# Patient Record
Sex: MALE | Race: WHITE
[De-identification: names, ages, dates, MRNs, and addresses within clinical notes are randomized per-mention and may not be internally consistent; named-entity substitution may affect disease eponyms.]

---

## 2017-09-21 ENCOUNTER — HOSPITAL ENCOUNTER (EMERGENCY)
Dept: HOSPITAL 10 - FTE | Age: 37
Discharge: HOME | End: 2017-09-21
Payer: SELF-PAY

## 2017-09-21 VITALS
HEIGHT: 66 IN | HEIGHT: 66 IN | BODY MASS INDEX: 26.75 KG/M2 | BODY MASS INDEX: 26.75 KG/M2 | WEIGHT: 166.45 LBS | WEIGHT: 166.45 LBS

## 2017-09-21 DIAGNOSIS — J02.9: Primary | ICD-10-CM

## 2017-09-21 PROCEDURE — 99283 EMERGENCY DEPT VISIT LOW MDM: CPT

## 2017-09-21 NOTE — ERD
ER Documentation


Chief Complaint


Date/Time


DATE: 9/21/17 


TIME: 21:59


Chief Complaint


c/o sore throat x 1 week.





HPI


This 37-year-old male complains of sore throat for last week.  May have a 

tactile fevers.  He is no chest pain or shortness of breath.  Patient gives a 

history of epigastric pain as well.  Denies lower abdominal pain, vomiting or 

diarrhea or neck stiffness or rashes.





ROS


All systems reviewed and are negative except as per history of present illness.





Medications


Home Meds


Active Scripts


Famotidine* (Pepcid*) 20 Mg Tablet, 20 MG PO BID for 14 Days, #30 TAB


   Prov:GWEN MARINO MD         9/21/17


Acetaminophen* (Tylophen*) 500 Mg Capsule, 1 CAP PO Q6H Y for PAIN AND OR 

ELEVATED TEMP, #15 CAP


   Prov:GWEN MARINO MD         9/21/17


Amoxicillin* (Amoxicillin*) 500 Mg Cap, 500 MG PO TID for 10 Days, CAP


   Prov:GWEN MARINO MD         9/21/17





Physical Exam


Vitals





Vital Signs








  Date Time  Temp Pulse Resp B/P Pulse Ox O2 Delivery O2 Flow Rate FiO2


 


9/21/17 20:37 98.7 80 18 154/90 98   








Physical Exam


Const:  [], Non-ill-appearing


Head:   Atraumatic 


Eyes:    Normal Conjunctiva


ENT:    Normal External Ears, Nose and Mouth.  Some redness in the posterior 

oropharynx.  Uvula is midline and no evidence of abscess.  Airways patent.


Neck:               Full range of motion..~ No meningismus.


Resp:    Clear to auscultation bilaterally


Cardio:    Regular rate and rhythm, no murmurs


Abd:    Soft, non tender, non distended. Normal bowel sounds


Skin:    No petechiae or rashes


Back:    No midline or flank tenderness


Ext:    No cyanosis, or edema


Neur:    Awake and alert


Psych:    Normal Mood and Affect





Procedures/MDM


Presents with sore throat for the last week.  Given patient request patient was 

treated with amoxicillin and Tylenol.  Patient is advised he may have symptoms 

of GERD treatment does not improve his symptoms.  He is advised to follow-up 

with primary doctor and was referred to local community health clinics but she 

does return to the ER for new or worsening symptoms.  No evidence of abscess, 

airway obstruction, cute abdomen, sepsis or additional emergent causes of 

presenting complaints.  The patient was stable with no new complaints during 

the ER course. Clinically, there is no current evidence to suggest meningitis, 

sepsis, acute abdomen, pneumonia, acute coronary syndrome, pulmonary embolism, 

or any other emergent condition appearing to require further evaluation or 

hospitalization. The patient should certainly return for any new or worsening 

symptoms per the aftercare instructions. They should otherwise follow-up with 

her primary care doctor for reevaluation this week.  Disclaimer: Inadvertent 

spelling and grammatical errors are likely due to EHR/dictation software use 

and do not reflect on the overall quality of patient care. Also, please note 

that the electronic time recorded on this note does not necessarily reflect the 

actual time of the patient encounter.





Departure


Diagnosis:  


 Primary Impression:  


 Sore throat


Condition:  Stable


Patient Instructions:  Self-Care for Sore Throats, Gerd (Adult)


Referrals:  


[facility])


Usted se ha hecho un examen mdico de control que le indica que no est en don 

condicin que requiera tratamiento urgente en el Departamento de Emergencia. Un 

estudio ms profundo y el tratamiento de vuong condicin pueden esperar sin ningn 

riesgo hasta que usted sea atendida/o en el consultorio de vuong mdico o don cl

wilfrid. Es responsabilidad suya arreglar don eduardo para el seguimiento del jinny. 





MANEJO DE CONDICIONES NO URGENTES EN EL FUTURO


1) Si usted tiene un mdico de atencin primaria:





Usted debera llamar a vuong mdico de atencin primaria antes de venir al 

departamento de emergencia. Despus de las horas de consultorio, vuong doctor o vuong 

asociado/a est disponible por telfono. El mdico o enfermero de coretta en el 

servicio telefnico puede asesorarle por alex medio para atender el problema, o 

jinny contrario se puede programar don eduardo.





2) Si usted no tiene un mdico de atencin primaria:


Llame al mdico o clnica de referencia que aparece abajo roxy las horas de 

consultorio para hacer don eduardo para que le vean.





CLINICAS:


[facility] [place]  [phone] [facility]  [phone] [facility] [phone] [address] [facility]  [phone]


[address] [place] [phone] [phone] 


[address]





Additional Instructions:  


VAMOS A TRATAR PARA INFECCION, CHERIE POSIBLEMENTE DE ESTOMAGO.  Examines normal 

hoy. Cheque otro vez con vuong doctor primario en el proximo perry or regresa para 

mas o nueva simptomas.











GWEN MARINO MD Sep 21, 2017 22:00

## 2018-08-06 ENCOUNTER — HOSPITAL ENCOUNTER (OUTPATIENT)
Dept: HOSPITAL 91 - E/R | Age: 38
Setting detail: OBSERVATION
LOS: 1 days | Discharge: HOME | End: 2018-08-07
Payer: MEDICAID

## 2018-08-06 ENCOUNTER — HOSPITAL ENCOUNTER (OUTPATIENT)
Age: 38
Setting detail: OBSERVATION
LOS: 1 days | Discharge: HOME | End: 2018-08-07

## 2018-08-06 DIAGNOSIS — R42: ICD-10-CM

## 2018-08-06 DIAGNOSIS — R00.2: ICD-10-CM

## 2018-08-06 DIAGNOSIS — R45.0: ICD-10-CM

## 2018-08-06 DIAGNOSIS — D72.819: ICD-10-CM

## 2018-08-06 DIAGNOSIS — R94.31: ICD-10-CM

## 2018-08-06 DIAGNOSIS — T16.2XXA: ICD-10-CM

## 2018-08-06 DIAGNOSIS — F43.9: ICD-10-CM
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
## 2018-08-06 DIAGNOSIS — R07.89: Primary | ICD-10-CM

## 2018-08-06 LAB
ADD MAN DIFF?: NO
ANION GAP: 13 (ref 8–16)
BASOPHIL #: 0 10^3/UL (ref 0–0.1)
BASOPHILS %: 0.7 % (ref 0–2)
BLOOD UREA NITROGEN: 10 MG/DL (ref 7–20)
CALCIUM: 9.1 MG/DL (ref 8.4–10.2)
CARBON DIOXIDE: 25 MMOL/L (ref 21–31)
CHLORIDE: 107 MMOL/L (ref 97–110)
CHOL/HDL RATIO: 6.4 RATIO
CHOLESTEROL: 188 MG/DL (ref 100–200)
CK INDEX: 0.5
CK-MB: 0.47 NG/ML (ref 0–2.4)
CREATINE KINASE: 93 IU/L (ref 23–200)
CREATININE: 0.74 MG/DL (ref 0.61–1.24)
EOSINOPHILS #: 0.2 10^3/UL (ref 0–0.5)
EOSINOPHILS %: 4.8 % (ref 0–7)
GLUCOSE: 100 MG/DL (ref 70–220)
HDL CHOLESTEROL: 29 MG/DL (ref 28–63)
HEMATOCRIT: 44.6 % (ref 42–52)
HEMOGLOBIN: 15 G/DL (ref 14–18)
INR: 0.98
LDL CHOLESTEROL,CALCULATED: 127 MG/DL
LYMPHOCYTES #: 1.4 10^3/UL (ref 0.8–2.9)
LYMPHOCYTES %: 31.2 % (ref 15–51)
MEAN CORPUSCULAR HEMOGLOBIN: 27.2 PG (ref 29–33)
MEAN CORPUSCULAR HGB CONC: 33.6 G/DL (ref 32–37)
MEAN CORPUSCULAR VOLUME: 80.8 FL (ref 82–101)
MEAN PLATELET VOLUME: 11 FL (ref 7.4–10.4)
MONOCYTE #: 0.5 10^3/UL (ref 0.3–0.9)
MONOCYTES %: 10.8 % (ref 0–11)
NEUTROPHIL #: 2.3 10^3/UL (ref 1.6–7.5)
NEUTROPHILS %: 51.6 % (ref 39–77)
NUCLEATED RED BLOOD CELLS #: 0 10^3/UL (ref 0–0)
NUCLEATED RED BLOOD CELLS%: 0 /100WBC (ref 0–0)
PARTIAL THROMBOPLASTIN TIME: 27.6 SEC (ref 25–35)
PLATELET COUNT: 177 10^3/UL (ref 140–415)
POTASSIUM: 4.2 MMOL/L (ref 3.5–5.1)
PROTIME: 13.1 SEC (ref 11.9–14.9)
PT RATIO: 1
RED BLOOD COUNT: 5.52 10^6/UL (ref 4.7–6.1)
RED CELL DISTRIBUTION WIDTH: 12.7 % (ref 11.5–14.5)
SODIUM: 141 MMOL/L (ref 135–144)
THYROID STIMULATING HORMONE: 1.72 MIU/L (ref 0.47–4.68)
TRIGLYCERIDES: 159 MG/DL (ref 0–149)
TROPONIN-I: < 0.01 NG/ML (ref 0–0.12)
TROPONIN-I: < 0.01 NG/ML (ref 0–0.12)
WHITE BLOOD COUNT: 4.4 10^3/UL (ref 4.8–10.8)

## 2018-08-06 PROCEDURE — 80307 DRUG TEST PRSMV CHEM ANLYZR: CPT

## 2018-08-06 PROCEDURE — 82550 ASSAY OF CK (CPK): CPT

## 2018-08-06 PROCEDURE — 93005 ELECTROCARDIOGRAM TRACING: CPT

## 2018-08-06 PROCEDURE — 85610 PROTHROMBIN TIME: CPT

## 2018-08-06 PROCEDURE — 84443 ASSAY THYROID STIM HORMONE: CPT

## 2018-08-06 PROCEDURE — 75571 CT HRT W/O DYE W/CA TEST: CPT

## 2018-08-06 PROCEDURE — 93306 TTE W/DOPPLER COMPLETE: CPT

## 2018-08-06 PROCEDURE — 96374 THER/PROPH/DIAG INJ IV PUSH: CPT

## 2018-08-06 PROCEDURE — 99291 CRITICAL CARE FIRST HOUR: CPT

## 2018-08-06 PROCEDURE — 80048 BASIC METABOLIC PNL TOTAL CA: CPT

## 2018-08-06 PROCEDURE — 75574 CT ANGIO HRT W/3D IMAGE: CPT

## 2018-08-06 PROCEDURE — 82553 CREATINE MB FRACTION: CPT

## 2018-08-06 PROCEDURE — 84484 ASSAY OF TROPONIN QUANT: CPT

## 2018-08-06 PROCEDURE — 36415 COLL VENOUS BLD VENIPUNCTURE: CPT

## 2018-08-06 PROCEDURE — 85730 THROMBOPLASTIN TIME PARTIAL: CPT

## 2018-08-06 PROCEDURE — 71045 X-RAY EXAM CHEST 1 VIEW: CPT

## 2018-08-06 PROCEDURE — 80061 LIPID PANEL: CPT

## 2018-08-06 PROCEDURE — 83735 ASSAY OF MAGNESIUM: CPT

## 2018-08-06 PROCEDURE — 85025 COMPLETE CBC W/AUTO DIFF WBC: CPT

## 2018-08-06 RX ADMIN — SODIUM CHLORIDE 1 ML: 9 INJECTION, SOLUTION INTRAMUSCULAR; INTRAVENOUS; SUBCUTANEOUS at 14:46

## 2018-08-06 RX ADMIN — IOHEXOL 1 ML/20 SEC: 350 INJECTION, SOLUTION INTRAVENOUS at 14:47

## 2018-08-06 RX ADMIN — MECLIZINE 1 MG: 12.5 TABLET ORAL at 12:36

## 2018-08-06 RX ADMIN — HEPARIN SODIUM 1 UNIT: 1000 INJECTION, SOLUTION INTRAVENOUS; SUBCUTANEOUS at 12:40

## 2018-08-06 RX ADMIN — NITROGLYCERIN 1 INCH: 20 OINTMENT TOPICAL at 12:37

## 2018-08-06 RX ADMIN — ASPIRIN 81 MG CHEWABLE TABLET 1 MG: 81 TABLET CHEWABLE at 12:36

## 2018-08-06 RX ADMIN — HYDROCODONE BITARTRATE AND ACETAMINOPHEN 1 TAB: 5; 325 TABLET ORAL at 14:57

## 2018-08-06 RX ADMIN — VASOPRESSIN 1 ML/10 SEC: 20 INJECTION, SOLUTION INTRAMUSCULAR; SUBCUTANEOUS at 14:47

## 2018-08-06 RX ADMIN — NITROGLYCERIN 1 SPRAY: 400 SPRAY ORAL at 14:11

## 2018-08-07 LAB
ADD MAN DIFF?: NO
ANION GAP: 12 (ref 8–16)
BASOPHIL #: 0 10^3/UL (ref 0–0.1)
BASOPHILS %: 0.6 % (ref 0–2)
BLOOD UREA NITROGEN: 17 MG/DL (ref 7–20)
CALCIUM: 9.2 MG/DL (ref 8.4–10.2)
CARBON DIOXIDE: 27 MMOL/L (ref 21–31)
CHLORIDE: 106 MMOL/L (ref 97–110)
CK INDEX: 0.5
CK-MB: 0.47 NG/ML (ref 0–2.4)
CREATINE KINASE: 94 IU/L (ref 23–200)
CREATININE: 0.86 MG/DL (ref 0.61–1.24)
EOSINOPHILS #: 0.2 10^3/UL (ref 0–0.5)
EOSINOPHILS %: 4.1 % (ref 0–7)
GLUCOSE: 87 MG/DL (ref 70–220)
HEMATOCRIT: 46.7 % (ref 42–52)
HEMOGLOBIN: 15.4 G/DL (ref 14–18)
LYMPHOCYTES #: 1.7 10^3/UL (ref 0.8–2.9)
LYMPHOCYTES %: 33.6 % (ref 15–51)
MAGNESIUM: 2.1 MG/DL (ref 1.7–2.5)
MEAN CORPUSCULAR HEMOGLOBIN: 26.4 PG (ref 29–33)
MEAN CORPUSCULAR HGB CONC: 33 G/DL (ref 32–37)
MEAN CORPUSCULAR VOLUME: 80.1 FL (ref 82–101)
MEAN PLATELET VOLUME: 11.1 FL (ref 7.4–10.4)
MONOCYTE #: 0.6 10^3/UL (ref 0.3–0.9)
MONOCYTES %: 11.2 % (ref 0–11)
NEUTROPHIL #: 2.6 10^3/UL (ref 1.6–7.5)
NEUTROPHILS %: 50.1 % (ref 39–77)
NUCLEATED RED BLOOD CELLS #: 0 10^3/UL (ref 0–0)
NUCLEATED RED BLOOD CELLS%: 0 /100WBC (ref 0–0)
PLATELET COUNT: 189 10^3/UL (ref 140–415)
POTASSIUM: 4.1 MMOL/L (ref 3.5–5.1)
RED BLOOD COUNT: 5.83 10^6/UL (ref 4.7–6.1)
RED CELL DISTRIBUTION WIDTH: 13.3 % (ref 11.5–14.5)
SODIUM: 141 MMOL/L (ref 135–144)
TROPONIN-I: < 0.01 NG/ML (ref 0–0.12)
WHITE BLOOD COUNT: 5.1 10^3/UL (ref 4.8–10.8)

## 2018-08-07 RX ADMIN — PANTOPRAZOLE SODIUM 1 MG: 40 TABLET, DELAYED RELEASE ORAL at 05:18
